# Patient Record
Sex: FEMALE | Race: OTHER | NOT HISPANIC OR LATINO
[De-identification: names, ages, dates, MRNs, and addresses within clinical notes are randomized per-mention and may not be internally consistent; named-entity substitution may affect disease eponyms.]

---

## 2018-04-09 ENCOUNTER — RESULT REVIEW (OUTPATIENT)
Age: 38
End: 2018-04-09

## 2019-07-29 ENCOUNTER — RESULT REVIEW (OUTPATIENT)
Age: 39
End: 2019-07-29

## 2019-08-25 ENCOUNTER — EMERGENCY (EMERGENCY)
Facility: HOSPITAL | Age: 39
LOS: 1 days | Discharge: HOME | End: 2019-08-25
Attending: EMERGENCY MEDICINE
Payer: COMMERCIAL

## 2019-08-25 VITALS
SYSTOLIC BLOOD PRESSURE: 134 MMHG | HEIGHT: 62 IN | DIASTOLIC BLOOD PRESSURE: 85 MMHG | HEART RATE: 90 BPM | WEIGHT: 126.1 LBS | TEMPERATURE: 98 F | RESPIRATION RATE: 18 BRPM | OXYGEN SATURATION: 100 %

## 2019-08-25 PROCEDURE — 99284 EMERGENCY DEPT VISIT MOD MDM: CPT

## 2019-08-25 NOTE — ED PROVIDER NOTE - CARE PLAN
Assessment and plan of treatment:	37yo F w/ abrupt onset rt sided abdominal pain    Plan:  Labs - CBC, CMP, Lipase, UA, Urine Pregnancy Principal Discharge DX:	Right flank pain  Assessment and plan of treatment:	39yo F w/ abrupt onset rt sided abdominal pain    Plan:  Labs - CBC, CMP, Lipase, UA, Urine Pregnancy

## 2019-08-25 NOTE — ED PROVIDER NOTE - ATTENDING CONTRIBUTION TO CARE
38F, no sig pmh, presents with R-sided abd pain, onset at ~9 pm. pain sharp, severe, non-radiating at first, 10/10 in severity. R periumbilical region. pain gradually worsened, when arrived in ED, pt did note pain rad to groin. pt states pain has now resolved. denies vag bleeding or discharge. denies dysuria, hematuria. pt denies flank pain, n/v/d, cp, sob, or other complaints. denies melena or hematochezia.    PE: NAD, NCAT, MMM, Trachea midline, Normal conjunctiva, lungs CTAB, S1/S2 RRR, Normal perfusion, 2+ radial pulses bilat, Abdomen Soft, NTND, No rebound/guarding, minimal R-sided cva ttp, No LE edema, No deformity of extremities, No rashes,  No focal motor or sensory deficits.     Given lack of abd ttp, very low suspicion acute surgical intra-abd pathology. Ddx includes but not limited to pyelonephritis, lower suspicion of kidney stone. Obtain labs, urinalysis. Will perform GYN exam to eval for adnexal tenderness, if positive will obtain TVUS eval for ovarian pathology. To re-eval. - Vahid Elizabeth MD

## 2019-08-25 NOTE — ED PROVIDER NOTE - OBJECTIVE STATEMENT
39yo F presents with rt sided abdominal pain since 2100 today.  Pain had abrupt onset and no relieving factors.  Patient says pain "started at a 10/10, progressed to a 12/10 and now is 8/10."  She tried taking a gas-x but experienced no relief with passage of gas.  She immediately came to ED due to severity of pain.  Since arrival pain is slightly better, and radiates on and off to groin/vagina.  Patient denies nausea/vomiting/headache/SOB/chest pain/vaginal discharge/vaginal bleeding/fevers/chills/changes in urination.

## 2019-08-25 NOTE — ED PROVIDER NOTE - PLAN OF CARE
37yo F w/ abrupt onset rt sided abdominal pain    Plan:  Labs - CBC, CMP, Lipase, UA, Urine Pregnancy

## 2019-08-25 NOTE — ED PROVIDER NOTE - PROGRESS NOTE DETAILS
Dr. Ferrer (Attending Physician)  Signed out to me. No adnexal tenderness on pelvic exam.  Abdomen soft and nontender, no mcburney's point tenderness. ua with rbc but patient spotting currently.  ho ureteral stones in the past is leading diagnosis.  Will dc home return precautions for appendicitis given.

## 2019-08-25 NOTE — ED PROVIDER NOTE - NSFOLLOWUPINSTRUCTIONS_ED_ALL_ED_FT
Return to ED if you develop worsening right lower abdominal pain, fever, chills, or for any worrisome concerns.

## 2019-08-26 LAB
ALBUMIN SERPL ELPH-MCNC: 4.5 G/DL — SIGNIFICANT CHANGE UP (ref 3.3–5)
ALP SERPL-CCNC: 74 U/L — SIGNIFICANT CHANGE UP (ref 40–120)
ALT FLD-CCNC: 9 U/L — LOW (ref 10–45)
ANION GAP SERPL CALC-SCNC: 11 MMOL/L — SIGNIFICANT CHANGE UP (ref 5–17)
APPEARANCE UR: CLEAR — SIGNIFICANT CHANGE UP
AST SERPL-CCNC: 13 U/L — SIGNIFICANT CHANGE UP (ref 10–40)
BACTERIA # UR AUTO: NEGATIVE — SIGNIFICANT CHANGE UP
BASOPHILS # BLD AUTO: 0 K/UL — SIGNIFICANT CHANGE UP (ref 0–0.2)
BASOPHILS NFR BLD AUTO: 0.4 % — SIGNIFICANT CHANGE UP (ref 0–2)
BILIRUB SERPL-MCNC: 0.1 MG/DL — LOW (ref 0.2–1.2)
BILIRUB UR-MCNC: NEGATIVE — SIGNIFICANT CHANGE UP
BUN SERPL-MCNC: 10 MG/DL — SIGNIFICANT CHANGE UP (ref 7–23)
CALCIUM SERPL-MCNC: 9.1 MG/DL — SIGNIFICANT CHANGE UP (ref 8.4–10.5)
CHLORIDE SERPL-SCNC: 104 MMOL/L — SIGNIFICANT CHANGE UP (ref 96–108)
CO2 SERPL-SCNC: 26 MMOL/L — SIGNIFICANT CHANGE UP (ref 22–31)
COLOR SPEC: SIGNIFICANT CHANGE UP
CREAT SERPL-MCNC: 0.73 MG/DL — SIGNIFICANT CHANGE UP (ref 0.5–1.3)
DIFF PNL FLD: ABNORMAL
EOSINOPHIL # BLD AUTO: 0.1 K/UL — SIGNIFICANT CHANGE UP (ref 0–0.5)
EOSINOPHIL NFR BLD AUTO: 0.8 % — SIGNIFICANT CHANGE UP (ref 0–6)
EPI CELLS # UR: 5 /HPF — SIGNIFICANT CHANGE UP
GLUCOSE SERPL-MCNC: 106 MG/DL — HIGH (ref 70–99)
GLUCOSE UR QL: NEGATIVE — SIGNIFICANT CHANGE UP
HCT VFR BLD CALC: 42 % — SIGNIFICANT CHANGE UP (ref 34.5–45)
HGB BLD-MCNC: 13.4 G/DL — SIGNIFICANT CHANGE UP (ref 11.5–15.5)
HYALINE CASTS # UR AUTO: 1 /LPF — SIGNIFICANT CHANGE UP (ref 0–2)
KETONES UR-MCNC: NEGATIVE — SIGNIFICANT CHANGE UP
LEUKOCYTE ESTERASE UR-ACNC: NEGATIVE — SIGNIFICANT CHANGE UP
LIDOCAIN IGE QN: 42 U/L — SIGNIFICANT CHANGE UP (ref 7–60)
LYMPHOCYTES # BLD AUTO: 1 K/UL — SIGNIFICANT CHANGE UP (ref 1–3.3)
LYMPHOCYTES # BLD AUTO: 9.9 % — LOW (ref 13–44)
MCHC RBC-ENTMCNC: 28.5 PG — SIGNIFICANT CHANGE UP (ref 27–34)
MCHC RBC-ENTMCNC: 31.9 GM/DL — LOW (ref 32–36)
MCV RBC AUTO: 89.4 FL — SIGNIFICANT CHANGE UP (ref 80–100)
MONOCYTES # BLD AUTO: 0.5 K/UL — SIGNIFICANT CHANGE UP (ref 0–0.9)
MONOCYTES NFR BLD AUTO: 4.8 % — SIGNIFICANT CHANGE UP (ref 2–14)
NEUTROPHILS # BLD AUTO: 8.8 K/UL — HIGH (ref 1.8–7.4)
NEUTROPHILS NFR BLD AUTO: 84 % — HIGH (ref 43–77)
NITRITE UR-MCNC: NEGATIVE — SIGNIFICANT CHANGE UP
PH UR: 7 — SIGNIFICANT CHANGE UP (ref 5–8)
PLATELET # BLD AUTO: 298 K/UL — SIGNIFICANT CHANGE UP (ref 150–400)
POTASSIUM SERPL-MCNC: 3.9 MMOL/L — SIGNIFICANT CHANGE UP (ref 3.5–5.3)
POTASSIUM SERPL-SCNC: 3.9 MMOL/L — SIGNIFICANT CHANGE UP (ref 3.5–5.3)
PROT SERPL-MCNC: 7.3 G/DL — SIGNIFICANT CHANGE UP (ref 6–8.3)
PROT UR-MCNC: ABNORMAL
RBC # BLD: 4.7 M/UL — SIGNIFICANT CHANGE UP (ref 3.8–5.2)
RBC # FLD: 12.8 % — SIGNIFICANT CHANGE UP (ref 10.3–14.5)
RBC CASTS # UR COMP ASSIST: 266 /HPF — HIGH (ref 0–4)
SODIUM SERPL-SCNC: 141 MMOL/L — SIGNIFICANT CHANGE UP (ref 135–145)
SP GR SPEC: 1.01 — SIGNIFICANT CHANGE UP (ref 1.01–1.02)
UROBILINOGEN FLD QL: NEGATIVE — SIGNIFICANT CHANGE UP
WBC # BLD: 10.4 K/UL — SIGNIFICANT CHANGE UP (ref 3.8–10.5)
WBC # FLD AUTO: 10.4 K/UL — SIGNIFICANT CHANGE UP (ref 3.8–10.5)
WBC UR QL: 3 /HPF — SIGNIFICANT CHANGE UP (ref 0–5)

## 2019-08-26 PROCEDURE — 85027 COMPLETE CBC AUTOMATED: CPT

## 2019-08-26 PROCEDURE — 80053 COMPREHEN METABOLIC PANEL: CPT

## 2019-08-26 PROCEDURE — 99283 EMERGENCY DEPT VISIT LOW MDM: CPT

## 2019-08-26 PROCEDURE — 83690 ASSAY OF LIPASE: CPT

## 2019-08-26 PROCEDURE — 81001 URINALYSIS AUTO W/SCOPE: CPT

## 2019-08-26 NOTE — ED ADULT NURSE NOTE - OBJECTIVE STATEMENT
39y/o female presented to the ED from home with complaint of abdominal pain . A&Ox3, ambulatory. No significant PMH. Patient reports acute onset of right lower quadrant pain starting around 9PM. Patient states that pain was severe but has since subsided. Currently rates pain 6/10. Patient states she tried taking gas-x but found no relief. Denies N/V/D, chest pain, fever, chills, numbness, tingling, urinary symptoms.

## 2020-11-01 ENCOUNTER — RESULT REVIEW (OUTPATIENT)
Age: 40
End: 2020-11-01

## 2022-02-24 PROBLEM — Z78.9 OTHER SPECIFIED HEALTH STATUS: Chronic | Status: ACTIVE | Noted: 2019-08-26

## 2022-03-28 ENCOUNTER — RESULT REVIEW (OUTPATIENT)
Age: 42
End: 2022-03-28

## 2022-03-28 ENCOUNTER — APPOINTMENT (OUTPATIENT)
Dept: OBGYN | Facility: CLINIC | Age: 42
End: 2022-03-28
Payer: COMMERCIAL

## 2022-03-28 PROCEDURE — 76830 TRANSVAGINAL US NON-OB: CPT

## 2022-03-28 PROCEDURE — 76856 US EXAM PELVIC COMPLETE: CPT | Mod: 59

## 2022-03-28 PROCEDURE — 99396 PREV VISIT EST AGE 40-64: CPT | Mod: 25

## 2022-03-28 PROCEDURE — 81002 URINALYSIS NONAUTO W/O SCOPE: CPT

## 2023-05-01 ENCOUNTER — APPOINTMENT (OUTPATIENT)
Dept: OBGYN | Facility: CLINIC | Age: 43
End: 2023-05-01
Payer: COMMERCIAL

## 2023-05-01 PROCEDURE — 81002 URINALYSIS NONAUTO W/O SCOPE: CPT

## 2023-05-01 PROCEDURE — 99396 PREV VISIT EST AGE 40-64: CPT

## 2023-05-01 PROCEDURE — 76830 TRANSVAGINAL US NON-OB: CPT

## 2023-12-04 ENCOUNTER — APPOINTMENT (OUTPATIENT)
Dept: OBGYN | Facility: CLINIC | Age: 43
End: 2023-12-04
Payer: COMMERCIAL

## 2023-12-04 PROCEDURE — 99212 OFFICE O/P EST SF 10 MIN: CPT

## 2023-12-04 PROCEDURE — 76830 TRANSVAGINAL US NON-OB: CPT

## 2024-06-03 ENCOUNTER — APPOINTMENT (OUTPATIENT)
Dept: OBGYN | Facility: CLINIC | Age: 44
End: 2024-06-03

## 2024-06-24 ENCOUNTER — APPOINTMENT (OUTPATIENT)
Dept: OBGYN | Facility: CLINIC | Age: 44
End: 2024-06-24

## 2024-06-24 PROCEDURE — 76830 TRANSVAGINAL US NON-OB: CPT

## 2024-06-24 PROCEDURE — 99459 PELVIC EXAMINATION: CPT

## 2024-06-24 PROCEDURE — 99396 PREV VISIT EST AGE 40-64: CPT

## 2024-06-24 PROCEDURE — 81002 URINALYSIS NONAUTO W/O SCOPE: CPT

## 2024-10-26 PROBLEM — Z00.00 ENCOUNTER FOR PREVENTIVE HEALTH EXAMINATION: Status: ACTIVE | Noted: 2024-10-26

## 2024-12-16 ENCOUNTER — APPOINTMENT (OUTPATIENT)
Dept: OBGYN | Facility: CLINIC | Age: 44
End: 2024-12-16
Payer: COMMERCIAL

## 2024-12-16 PROCEDURE — 76830 TRANSVAGINAL US NON-OB: CPT

## 2024-12-16 PROCEDURE — 99213 OFFICE O/P EST LOW 20 MIN: CPT

## 2025-06-30 ENCOUNTER — APPOINTMENT (OUTPATIENT)
Dept: OBGYN | Facility: CLINIC | Age: 45
End: 2025-06-30